# Patient Record
Sex: FEMALE | Race: WHITE | NOT HISPANIC OR LATINO | Employment: FULL TIME | URBAN - METROPOLITAN AREA
[De-identification: names, ages, dates, MRNs, and addresses within clinical notes are randomized per-mention and may not be internally consistent; named-entity substitution may affect disease eponyms.]

---

## 2021-09-13 ENCOUNTER — HISTORICAL (OUTPATIENT)
Dept: ADMINISTRATIVE | Facility: HOSPITAL | Age: 36
End: 2021-09-13

## 2021-09-13 LAB
GLUCOSE 1H P 100 G GLC PO SERPL-MCNC: 104 MG/DL (ref 100–180)
GLUCOSE 2H P 100 G GLC PO SERPL-MCNC: 122 MG/DL (ref 70–140)
GLUCOSE 3H P 100 G GLC PO SERPL-MCNC: 54 MG/DL (ref 70–115)
GLUCOSE BS SERPL-MCNC: 81 MG/DL (ref 70–115)

## 2021-09-29 ENCOUNTER — HISTORICAL (OUTPATIENT)
Dept: ADMINISTRATIVE | Facility: HOSPITAL | Age: 36
End: 2021-09-29

## 2021-09-30 DIAGNOSIS — O16.3 HYPERTENSION AFFECTING PREGNANCY IN THIRD TRIMESTER: ICD-10-CM

## 2021-09-30 DIAGNOSIS — K46.9 ABDOMINAL HERNIA WITHOUT OBSTRUCTION AND WITHOUT GANGRENE, RECURRENCE NOT SPECIFIED, UNSPECIFIED HERNIA TYPE: ICD-10-CM

## 2021-09-30 DIAGNOSIS — O34.219 PREVIOUS CESAREAN SECTION COMPLICATING PREGNANCY: Primary | ICD-10-CM

## 2021-09-30 PROBLEM — O99.843 PREVIOUS BARIATRIC SURGERY COMPLICATING PREGNANCY, THIRD TRIMESTER: Status: ACTIVE | Noted: 2021-09-30

## 2021-10-04 ENCOUNTER — OFFICE VISIT (OUTPATIENT)
Dept: MATERNAL FETAL MEDICINE | Facility: CLINIC | Age: 36
End: 2021-10-04
Payer: COMMERCIAL

## 2021-10-04 ENCOUNTER — PROCEDURE VISIT (OUTPATIENT)
Dept: MATERNAL FETAL MEDICINE | Facility: CLINIC | Age: 36
End: 2021-10-04
Payer: COMMERCIAL

## 2021-10-04 VITALS
BODY MASS INDEX: 50.8 KG/M2 | DIASTOLIC BLOOD PRESSURE: 61 MMHG | WEIGHT: 269.06 LBS | HEIGHT: 61 IN | SYSTOLIC BLOOD PRESSURE: 124 MMHG | HEART RATE: 85 BPM

## 2021-10-04 DIAGNOSIS — O34.219 PREVIOUS CESAREAN SECTION COMPLICATING PREGNANCY: ICD-10-CM

## 2021-10-04 DIAGNOSIS — K46.9 ABDOMINAL HERNIA WITHOUT OBSTRUCTION AND WITHOUT GANGRENE, RECURRENCE NOT SPECIFIED, UNSPECIFIED HERNIA TYPE: ICD-10-CM

## 2021-10-04 DIAGNOSIS — O99.213 OBESITY AFFECTING PREGNANCY IN THIRD TRIMESTER: ICD-10-CM

## 2021-10-04 DIAGNOSIS — O16.3 HYPERTENSION AFFECTING PREGNANCY IN THIRD TRIMESTER: ICD-10-CM

## 2021-10-04 DIAGNOSIS — K45.8 OTHER SPECIFIED ABDOMINAL HERNIA WITHOUT OBSTRUCTION OR GANGRENE: ICD-10-CM

## 2021-10-04 DIAGNOSIS — O09.899 SINGLE UMBILICAL ARTERY AFFECTING MANAGEMENT OF MOTHER IN SINGLETON PREGNANCY, ANTEPARTUM: ICD-10-CM

## 2021-10-04 DIAGNOSIS — U07.1 COVID-19 AFFECTING PREGNANCY IN THIRD TRIMESTER: ICD-10-CM

## 2021-10-04 DIAGNOSIS — O99.843 PREVIOUS BARIATRIC SURGERY COMPLICATING PREGNANCY, THIRD TRIMESTER: ICD-10-CM

## 2021-10-04 DIAGNOSIS — O98.513 COVID-19 AFFECTING PREGNANCY IN THIRD TRIMESTER: ICD-10-CM

## 2021-10-04 PROCEDURE — 3074F SYST BP LT 130 MM HG: CPT | Mod: CPTII,S$GLB,, | Performed by: OBSTETRICS & GYNECOLOGY

## 2021-10-04 PROCEDURE — 1159F PR MEDICATION LIST DOCUMENTED IN MEDICAL RECORD: ICD-10-PCS | Mod: CPTII,S$GLB,, | Performed by: OBSTETRICS & GYNECOLOGY

## 2021-10-04 PROCEDURE — 3078F PR MOST RECENT DIASTOLIC BLOOD PRESSURE < 80 MM HG: ICD-10-PCS | Mod: CPTII,S$GLB,, | Performed by: OBSTETRICS & GYNECOLOGY

## 2021-10-04 PROCEDURE — 3074F PR MOST RECENT SYSTOLIC BLOOD PRESSURE < 130 MM HG: ICD-10-PCS | Mod: CPTII,S$GLB,, | Performed by: OBSTETRICS & GYNECOLOGY

## 2021-10-04 PROCEDURE — 99205 PR OFFICE/OUTPT VISIT, NEW, LEVL V, 60-74 MIN: ICD-10-PCS | Mod: 25,S$GLB,, | Performed by: OBSTETRICS & GYNECOLOGY

## 2021-10-04 PROCEDURE — 3008F PR BODY MASS INDEX (BMI) DOCUMENTED: ICD-10-PCS | Mod: CPTII,S$GLB,, | Performed by: OBSTETRICS & GYNECOLOGY

## 2021-10-04 PROCEDURE — 99205 OFFICE O/P NEW HI 60 MIN: CPT | Mod: 25,S$GLB,, | Performed by: OBSTETRICS & GYNECOLOGY

## 2021-10-04 PROCEDURE — 3008F BODY MASS INDEX DOCD: CPT | Mod: CPTII,S$GLB,, | Performed by: OBSTETRICS & GYNECOLOGY

## 2021-10-04 PROCEDURE — 76811 PR US, OB FETAL EVAL & EXAM, TRANSABDOM,FIRST GESTATION: ICD-10-PCS | Mod: S$GLB,,, | Performed by: OBSTETRICS & GYNECOLOGY

## 2021-10-04 PROCEDURE — 76811 OB US DETAILED SNGL FETUS: CPT | Mod: S$GLB,,, | Performed by: OBSTETRICS & GYNECOLOGY

## 2021-10-04 PROCEDURE — 3078F DIAST BP <80 MM HG: CPT | Mod: CPTII,S$GLB,, | Performed by: OBSTETRICS & GYNECOLOGY

## 2021-10-04 PROCEDURE — 1159F MED LIST DOCD IN RCRD: CPT | Mod: CPTII,S$GLB,, | Performed by: OBSTETRICS & GYNECOLOGY

## 2021-10-04 RX ORDER — NAPROXEN SODIUM 220 MG/1
81 TABLET, FILM COATED ORAL DAILY
COMMUNITY
End: 2021-10-04

## 2021-10-25 DIAGNOSIS — Z36.89 ENCOUNTER FOR ULTRASOUND TO ASSESS FETAL GROWTH: Primary | ICD-10-CM

## 2021-10-26 ENCOUNTER — PROCEDURE VISIT (OUTPATIENT)
Dept: MATERNAL FETAL MEDICINE | Facility: CLINIC | Age: 36
End: 2021-10-26
Payer: COMMERCIAL

## 2021-10-26 DIAGNOSIS — Z36.89 ENCOUNTER FOR ULTRASOUND TO ASSESS FETAL GROWTH: ICD-10-CM

## 2021-10-28 DIAGNOSIS — Z36.89 ENCOUNTER FOR ULTRASOUND TO ASSESS FETAL GROWTH: Primary | ICD-10-CM

## 2021-10-28 DIAGNOSIS — O09.899 SINGLE UMBILICAL ARTERY AFFECTING MANAGEMENT OF MOTHER IN SINGLETON PREGNANCY, ANTEPARTUM: ICD-10-CM

## 2021-10-28 DIAGNOSIS — K45.8 OTHER SPECIFIED ABDOMINAL HERNIA WITHOUT OBSTRUCTION OR GANGRENE: ICD-10-CM

## 2021-10-28 DIAGNOSIS — O99.213 OBESITY AFFECTING PREGNANCY IN THIRD TRIMESTER: ICD-10-CM

## 2021-10-28 DIAGNOSIS — O16.3 HYPERTENSION AFFECTING PREGNANCY IN THIRD TRIMESTER: ICD-10-CM

## 2021-10-28 DIAGNOSIS — O99.843 PREVIOUS BARIATRIC SURGERY COMPLICATING PREGNANCY, THIRD TRIMESTER: ICD-10-CM

## 2021-11-01 ENCOUNTER — OFFICE VISIT (OUTPATIENT)
Dept: MATERNAL FETAL MEDICINE | Facility: CLINIC | Age: 36
End: 2021-11-01
Payer: COMMERCIAL

## 2021-11-01 ENCOUNTER — PROCEDURE VISIT (OUTPATIENT)
Dept: MATERNAL FETAL MEDICINE | Facility: CLINIC | Age: 36
End: 2021-11-01
Payer: COMMERCIAL

## 2021-11-01 VITALS
HEIGHT: 61 IN | WEIGHT: 270.75 LBS | BODY MASS INDEX: 51.12 KG/M2 | HEART RATE: 89 BPM | SYSTOLIC BLOOD PRESSURE: 132 MMHG | DIASTOLIC BLOOD PRESSURE: 65 MMHG

## 2021-11-01 DIAGNOSIS — O09.899 SINGLE UMBILICAL ARTERY AFFECTING MANAGEMENT OF MOTHER IN SINGLETON PREGNANCY, ANTEPARTUM: ICD-10-CM

## 2021-11-01 DIAGNOSIS — O99.213 OBESITY AFFECTING PREGNANCY IN THIRD TRIMESTER: ICD-10-CM

## 2021-11-01 DIAGNOSIS — K45.8 OTHER SPECIFIED ABDOMINAL HERNIA WITHOUT OBSTRUCTION OR GANGRENE: ICD-10-CM

## 2021-11-01 DIAGNOSIS — O98.513 COVID-19 AFFECTING PREGNANCY IN THIRD TRIMESTER: ICD-10-CM

## 2021-11-01 DIAGNOSIS — O99.843 PREVIOUS BARIATRIC SURGERY COMPLICATING PREGNANCY, THIRD TRIMESTER: ICD-10-CM

## 2021-11-01 DIAGNOSIS — Z36.89 ENCOUNTER FOR ULTRASOUND TO ASSESS FETAL GROWTH: ICD-10-CM

## 2021-11-01 DIAGNOSIS — O16.3 HYPERTENSION AFFECTING PREGNANCY IN THIRD TRIMESTER: ICD-10-CM

## 2021-11-01 DIAGNOSIS — U07.1 COVID-19 AFFECTING PREGNANCY IN THIRD TRIMESTER: ICD-10-CM

## 2021-11-01 PROCEDURE — 76815 OB US LIMITED FETUS(S): CPT | Mod: S$GLB,,, | Performed by: OBSTETRICS & GYNECOLOGY

## 2021-11-01 PROCEDURE — 1159F PR MEDICATION LIST DOCUMENTED IN MEDICAL RECORD: ICD-10-PCS | Mod: CPTII,S$GLB,, | Performed by: OBSTETRICS & GYNECOLOGY

## 2021-11-01 PROCEDURE — 3008F PR BODY MASS INDEX (BMI) DOCUMENTED: ICD-10-PCS | Mod: CPTII,S$GLB,, | Performed by: OBSTETRICS & GYNECOLOGY

## 2021-11-01 PROCEDURE — 76819 PR US, OB, FETAL BIOPHYSICAL, W/O NST: ICD-10-PCS | Mod: S$GLB,,, | Performed by: OBSTETRICS & GYNECOLOGY

## 2021-11-01 PROCEDURE — 3078F DIAST BP <80 MM HG: CPT | Mod: CPTII,S$GLB,, | Performed by: OBSTETRICS & GYNECOLOGY

## 2021-11-01 PROCEDURE — 3075F PR MOST RECENT SYSTOLIC BLOOD PRESS GE 130-139MM HG: ICD-10-PCS | Mod: CPTII,S$GLB,, | Performed by: OBSTETRICS & GYNECOLOGY

## 2021-11-01 PROCEDURE — 99213 OFFICE O/P EST LOW 20 MIN: CPT | Mod: 25,S$GLB,, | Performed by: OBSTETRICS & GYNECOLOGY

## 2021-11-01 PROCEDURE — 3078F PR MOST RECENT DIASTOLIC BLOOD PRESSURE < 80 MM HG: ICD-10-PCS | Mod: CPTII,S$GLB,, | Performed by: OBSTETRICS & GYNECOLOGY

## 2021-11-01 PROCEDURE — 76815 PR  US,PREGNANT UTERUS,LIMITED, 1/> FETUSES: ICD-10-PCS | Mod: S$GLB,,, | Performed by: OBSTETRICS & GYNECOLOGY

## 2021-11-01 PROCEDURE — 99213 PR OFFICE/OUTPT VISIT, EST, LEVL III, 20-29 MIN: ICD-10-PCS | Mod: 25,S$GLB,, | Performed by: OBSTETRICS & GYNECOLOGY

## 2021-11-01 PROCEDURE — 3008F BODY MASS INDEX DOCD: CPT | Mod: CPTII,S$GLB,, | Performed by: OBSTETRICS & GYNECOLOGY

## 2021-11-01 PROCEDURE — 1159F MED LIST DOCD IN RCRD: CPT | Mod: CPTII,S$GLB,, | Performed by: OBSTETRICS & GYNECOLOGY

## 2021-11-01 PROCEDURE — 3075F SYST BP GE 130 - 139MM HG: CPT | Mod: CPTII,S$GLB,, | Performed by: OBSTETRICS & GYNECOLOGY

## 2021-11-01 PROCEDURE — 76819 FETAL BIOPHYS PROFIL W/O NST: CPT | Mod: S$GLB,,, | Performed by: OBSTETRICS & GYNECOLOGY

## 2021-11-18 DIAGNOSIS — O99.213 OBESITY AFFECTING PREGNANCY IN THIRD TRIMESTER: ICD-10-CM

## 2021-11-18 DIAGNOSIS — K45.8 OTHER SPECIFIED ABDOMINAL HERNIA WITHOUT OBSTRUCTION OR GANGRENE: ICD-10-CM

## 2021-11-18 DIAGNOSIS — O16.3 HYPERTENSION AFFECTING PREGNANCY IN THIRD TRIMESTER: ICD-10-CM

## 2021-11-18 DIAGNOSIS — O99.843 PREVIOUS BARIATRIC SURGERY COMPLICATING PREGNANCY, THIRD TRIMESTER: Primary | ICD-10-CM

## 2021-12-09 ENCOUNTER — HISTORICAL (OUTPATIENT)
Dept: ADMINISTRATIVE | Facility: HOSPITAL | Age: 36
End: 2021-12-09

## 2021-12-09 LAB
ABS NEUT (OLG): 4.6 X10(3)/MCL (ref 2.1–9.2)
BASOPHILS # BLD AUTO: 0 X10(3)/MCL (ref 0–0.2)
BASOPHILS NFR BLD AUTO: 0 %
EOSINOPHIL # BLD AUTO: 0.1 X10(3)/MCL (ref 0–0.9)
EOSINOPHIL NFR BLD AUTO: 1 %
ERYTHROCYTE [DISTWIDTH] IN BLOOD BY AUTOMATED COUNT: 14.2 % (ref 11.5–17)
GROUP & RH: NORMAL
HCT VFR BLD AUTO: 36.2 % (ref 37–47)
HGB BLD-MCNC: 11.7 GM/DL (ref 12–16)
LYMPHOCYTES # BLD AUTO: 2.6 X10(3)/MCL (ref 0.6–4.6)
LYMPHOCYTES NFR BLD AUTO: 33 %
MCH RBC QN AUTO: 29.2 PG (ref 27–31)
MCHC RBC AUTO-ENTMCNC: 32.3 GM/DL (ref 33–36)
MCV RBC AUTO: 90.3 FL (ref 80–94)
MONOCYTES # BLD AUTO: 0.5 X10(3)/MCL (ref 0.1–1.3)
MONOCYTES NFR BLD AUTO: 6 %
NEUTROPHILS # BLD AUTO: 4.6 X10(3)/MCL (ref 2.1–9.2)
NEUTROPHILS NFR BLD AUTO: 58 %
PLATELET # BLD AUTO: 342 X10(3)/MCL (ref 130–400)
PMV BLD AUTO: 9.5 FL (ref 9.4–12.4)
RBC # BLD AUTO: 4.01 X10(6)/MCL (ref 4.2–5.4)
SARS-COV-2 AG RESP QL IA.RAPID: NEGATIVE
T PALLIDUM AB SER QL: NONREACTIVE
WBC # SPEC AUTO: 7.9 X10(3)/MCL (ref 4.5–11.5)

## 2024-07-08 ENCOUNTER — TELEPHONE (OUTPATIENT)
Dept: SURGERY | Facility: CLINIC | Age: 39
End: 2024-07-08
Payer: COMMERCIAL

## 2024-07-08 NOTE — TELEPHONE ENCOUNTER
Pt coming in for incisional hernia due to multiple c-sections  Referred by Dr. Dulce Jensen     Pt coming for second opinion. Previous surgeon (can't remember his name) advised to go [facility] to [facility] a specialist. However she would like to stay local if at all possible.     Scheduled 7/16     Testing: CT/MRI ?? Unknown of type   Done with Dr. Carloz SEGURA  Also mentioned her uterus was coming through her hernia     update address:   [address] [place] [id number]

## 2024-07-09 DIAGNOSIS — K46.9 ABDOMINAL HERNIA WITHOUT OBSTRUCTION AND WITHOUT GANGRENE: Primary | ICD-10-CM

## 2024-07-16 ENCOUNTER — OFFICE VISIT (OUTPATIENT)
Dept: SURGERY | Facility: CLINIC | Age: 39
End: 2024-07-16
Payer: COMMERCIAL

## 2024-07-16 VITALS
DIASTOLIC BLOOD PRESSURE: 85 MMHG | HEART RATE: 76 BPM | BODY MASS INDEX: 54 KG/M2 | HEIGHT: 61 IN | SYSTOLIC BLOOD PRESSURE: 122 MMHG | WEIGHT: 286 LBS

## 2024-07-16 DIAGNOSIS — E66.01 SEVERE OBESITY DUE TO EXCESS CALORIES AFFECTING PREGNANCY IN THIRD TRIMESTER: ICD-10-CM

## 2024-07-16 DIAGNOSIS — K43.2 RECURRENT INCISIONAL HERNIA WITH COMPLICATION: ICD-10-CM

## 2024-07-16 DIAGNOSIS — Z01.818 PREOP EXAMINATION: Primary | ICD-10-CM

## 2024-07-16 DIAGNOSIS — K46.9 ABDOMINAL HERNIA WITHOUT OBSTRUCTION AND WITHOUT GANGRENE: ICD-10-CM

## 2024-07-16 DIAGNOSIS — R10.30 LOWER ABDOMINAL PAIN: ICD-10-CM

## 2024-07-16 DIAGNOSIS — O99.213 SEVERE OBESITY DUE TO EXCESS CALORIES AFFECTING PREGNANCY IN THIRD TRIMESTER: ICD-10-CM

## 2024-07-16 PROCEDURE — 3008F BODY MASS INDEX DOCD: CPT | Mod: CPTII,,, | Performed by: SURGERY

## 2024-07-16 PROCEDURE — 3074F SYST BP LT 130 MM HG: CPT | Mod: CPTII,,, | Performed by: SURGERY

## 2024-07-16 PROCEDURE — 99204 OFFICE O/P NEW MOD 45 MIN: CPT | Mod: ,,, | Performed by: SURGERY

## 2024-07-16 PROCEDURE — 3079F DIAST BP 80-89 MM HG: CPT | Mod: CPTII,,, | Performed by: SURGERY

## 2024-07-24 ENCOUNTER — PATIENT MESSAGE (OUTPATIENT)
Dept: SURGERY | Facility: CLINIC | Age: 39
End: 2024-07-24
Payer: COMMERCIAL

## 2024-09-11 PROCEDURE — 99284 EMERGENCY DEPT VISIT MOD MDM: CPT

## 2024-09-11 PROCEDURE — 82962 GLUCOSE BLOOD TEST: CPT

## 2024-09-11 PROCEDURE — 56405 I&D VULVA/PERINEAL ABSCESS: CPT

## 2024-09-12 ENCOUNTER — HOSPITAL ENCOUNTER (EMERGENCY)
Facility: HOSPITAL | Age: 39
Discharge: HOME | End: 2024-09-12
Attending: EMERGENCY MEDICINE
Payer: COMMERCIAL

## 2024-09-12 VITALS
TEMPERATURE: 98 F | BODY MASS INDEX: 49.69 KG/M2 | DIASTOLIC BLOOD PRESSURE: 93 MMHG | OXYGEN SATURATION: 99 % | WEIGHT: 270 LBS | HEART RATE: 74 BPM | RESPIRATION RATE: 18 BRPM | SYSTOLIC BLOOD PRESSURE: 163 MMHG | HEIGHT: 62 IN

## 2024-09-12 DIAGNOSIS — N76.4 LABIAL ABSCESS: Primary | ICD-10-CM

## 2024-09-12 LAB — POCT GLUCOSE: 105 MG/DL (ref 70–110)

## 2024-09-12 PROCEDURE — 56405 I&D VULVA/PERINEAL ABSCESS: CPT

## 2024-09-12 RX ORDER — HYDROCODONE BITARTRATE AND ACETAMINOPHEN 5; 325 MG/1; MG/1
1 TABLET ORAL EVERY 4 HOURS PRN
Qty: 8 TABLET | Refills: 0 | Status: SHIPPED | OUTPATIENT
Start: 2024-09-12

## 2024-09-12 RX ORDER — MUPIROCIN CALCIUM 20 MG/G
CREAM TOPICAL 3 TIMES DAILY
Qty: 30 G | Refills: 0 | Status: SHIPPED | OUTPATIENT
Start: 2024-09-12 | End: 2024-09-19

## 2024-09-12 RX ORDER — IBUPROFEN 600 MG/1
600 TABLET ORAL EVERY 6 HOURS PRN
Qty: 20 TABLET | Refills: 0 | Status: SHIPPED | OUTPATIENT
Start: 2024-09-12

## 2024-09-12 NOTE — ED PROVIDER NOTES
Encounter Date: 2024       History     Chief Complaint   Patient presents with    Abscess     Pt. C/o abscess to labia that she noticed yesterday and subjective fever.     39 F history of GERD gastric sleeve hyperlipidemia PCOS denies a history of diabetes does have family history of diabetes states she was had abscesses in the past states onset of labial abscess yesterday more swollen and painful today.  No fevers or chills.  Denies any drug allergies.  She was followed by an OBGYN        Review of patient's allergies indicates:  No Known Allergies  Past Medical History:   Diagnosis Date    Abdominal hernia     GERD (gastroesophageal reflux disease)     History of sleeve gastrectomy     Hypertension     Incisional hernia 2023    Primary closure of incisional hernia; Philippe Scott MD    Migraine     PCOS (polycystic ovarian syndrome)      Past Surgical History:   Procedure Laterality Date     SECTION      x4    CHOLECYSTECTOMY      gastric sleeve  2013    Dr. Mathew in [facility]    WRIST SURGERY      x2     Family History   Problem Relation Name Age of Onset    Heart attack Mother      Stroke Father       Social History     Tobacco Use    Smoking status: Never    Smokeless tobacco: Never   Substance Use Topics    Alcohol use: Not Currently    Drug use: Never     Review of Systems   Constitutional:  Negative for fever.   Respiratory:  Negative for shortness of breath.    Cardiovascular:  Negative for chest pain.   Gastrointestinal:  Negative for abdominal pain.       Physical Exam     Initial Vitals [24 2358]   BP Pulse Resp Temp SpO2   (!) 170/97 72 16 98 °F (36.7 °C) 98 %      MAP       --         Physical Exam    Nursing note and vitals reviewed.  Constitutional: She appears well-developed and well-nourished.   HENT:   Head: Atraumatic.   Eyes: Conjunctivae are normal.   Pulmonary/Chest: No respiratory distress.   Genitourinary:    Genitourinary Comments: Left labial abscess present minimal  surrounding erythema.  Purulence visualized just under the skin without spontaneous drainage.  No palpable hernia in the inguinal region or labial region       Neurological: She is oriented to person, place, and time.   Skin: Skin is warm and dry.   Psychiatric: She has a normal mood and affect.         ED Course   I & D - Incision and Drainage    Date/Time: 9/12/2024 12:33 AM  Location procedure was performed: [facility] EMERGENCY DEPARTMENT    Performed by: Jaguar Fraga MD  Authorized by: Jaguar Fraga MD  Consent Done: Yes  Consent: Verbal consent obtained.  Consent given by: patient  Type: abscess  Location: Left labial.  Anesthesia: local infiltration    Anesthesia:  Local Anesthetic: bupivacaine 0.5% without epinephrine  Anesthetic total: 1 mL  Scalpel size: 11  Incision type: single straight  Incision depth: dermal  Complexity: simple  Drainage: pus  Drainage amount: moderate  Wound treatment: incision and wound left open  Complications: No  Comments: Yellowish white purulence with black present.  Purulence was manually expressed.  Loculations broken up with hemostats.  Abscess then irrigated with saline until returned only bloody drainage.  Prescribed mupirocin topically    Incision depth: dermal        Labs Reviewed   POCT GLUCOSE, HAND-HELD DEVICE   POCT GLUCOSE       Result Value    POCT Glucose 105            Imaging Results    None          Medications - No data to display  Medical Decision Making  Differential diagnosis abscess, Bartholin's cyst, cellulitis, diabetes    Amount and/or Complexity of Data Reviewed  Labs: ordered.                     Offered injection of Toradol patient declined.  Prescribed ibuprofen low-dose norco. I discussed treatment of pain.  Discussed that opioids are addictive and they do not need to take the medication if they do not want to.  They do not need to fill the whole prescription and can get a partial fill if desired. Do not drive or drink alcohol when taking  medication.  CBD checked it was normal.                     Clinical Impression:  Final diagnoses:  [N76.4] Labial abscess (Primary)          ED Disposition Condition    Discharge Stable          ED Prescriptions       Medication Sig Dispense Start Date End Date Auth. Provider    mupirocin calcium 2% (BACTROBAN) 2 % cream Apply topically 3 (three) times daily. for 7 days 30 g 9/12/2024 9/19/2024 Jaguar Fraga MD    HYDROcodone-acetaminophen (NORCO) 5-325 mg per tablet Take 1 tablet by mouth every 4 (four) hours as needed for Pain. 8 tablet 9/12/2024 -- Jaguar Fraga MD    ibuprofen (ADVIL,MOTRIN) 600 MG tablet Take 1 tablet (600 mg total) by mouth every 6 (six) hours as needed for Pain. 20 tablet 9/12/2024 -- Jaguar Fraga MD          Follow-up Information       Follow up With Specialties Details Why Contact Info    Primary care provider   You can call [phone] to get set up with a local primary care provider within the next few days.If your symptoms worsen or change please return to the emergency department for re-evaluation Call your primary care provider to schedule a follow-up appointment within a week             Jaguar Fraga MD  09/12/24 0036

## 2024-10-09 NOTE — PROGRESS NOTES
[facility] Surgical - General Surgery Services  [address] [place] [id number]  Phone: [phone]  Fax: [phone]     HISTORY & PHYSICAL  General Surgery  Dr. Emanuel Rice      Patient Name: Deb Forde  YOB: 1985  Author: Emanuel Rice MD     Date: 10/09/2024                   SUBJECTIVE:     Chief Complaint/Reason for Admission:   Chief Complaint   Patient presents with    Consult     Incisional hernia        History of Present Illness:  Ms. Deb Forde is a 39 y.o. female who presents to clinic for surgical evaluation of abdominal ventral hernia.     She started noticing a bulge to abdominal wall.  It occasionally causes some pain and discomfort.   Denies any changes to bowel / bladder habits. She denies CP/SOB or fever/chills.     She has been told the uterus is in the hernia.    Hernia present for 6months.  Previous hernia repair: yes    Previous hernia repair: Open Incisional Hernia    Positive symptoms:   Abdominal Pain Generalized and Bulge      Referring provider: Dulce Jensen MD   PCP: No, Primary Doctor     Review of Systems:  12 point ROS negative except as stated in HPI    PAST HISTORY:     Past Medical History:   Diagnosis Date    Abdominal hernia     GERD (gastroesophageal reflux disease)     History of sleeve gastrectomy     Hypertension     Incisional hernia 2023    Primary closure of incisional hernia; Philippe Scott MD    Migraine     PCOS (polycystic ovarian syndrome)      Past Surgical History:   Procedure Laterality Date     SECTION      x4    CHOLECYSTECTOMY      gastric sleeve  2013    Dr. Mathew in [facility]    WRIST SURGERY      x2     Family History   Problem Relation Name Age of Onset    Heart attack Mother      Stroke Father       Social History     Socioeconomic History    Marital status:      Spouse name: Rohiniyue Badilloann   Occupational History    Occupation: Speech Therapist   Tobacco Use     "Smoking status: Never    Smokeless tobacco: Never   Substance and Sexual Activity    Alcohol use: Not Currently    Drug use: Never    Sexual activity: Yes       MEDICATIONS & ALLERGIES:     No current outpatient medications on file prior to visit.     No current facility-administered medications on file prior to visit.     Review of patient's allergies indicates:  No Known Allergies    OBJECTIVE:     Vitals:    07/16/24 1518   BP: 122/85   Pulse: 76   Weight: 129.7 kg (286 lb)   Height: 5' 1" (1.549 m)     Body mass index is 54.04 kg/m².    Physical Exam:  General:  Well developed, well nourished, no acute distress  HEENT:  Normocephalic, atraumatic, PERRL, EOMI, clear sclera, ears normal, neck supple, throat clear without erythema or exudates  CVS:  RRR, S1 and S2 normal, no murmurs, rubs, gallops  Resp:  Lungs clear to auscultation, no wheezes, rales, rhonchi, cough  GI:  incisional hernia suprapubic region area reduces incompletely Soft, BS+  :  Deferred  MSK:  No muscle atrophy, cyanosis, peripheral edema, full range of motion  Skin:  No rashes, ulcers, erythema  Neuro:  CNII-XII grossly intact  Psych:  Alert and oriented to person, place, and time    Results:  I have independently reviewed all pertinent lab and radiologic studies relevant to general surgery.      VISIT DIAGNOSES:       ICD-10-CM ICD-9-CM   1. Preop examination  Z01.818 V72.84   2. Abdominal hernia without obstruction and without gangrene  K46.9 553.20   3. Severe obesity due to excess calories affecting pregnancy in third trimester  O99.213 649.13    E66.01 278.01   4. Lower abdominal pain  R10.30 789.09       ASSESSMENT/PLAN:       Plan:  Open Incisional Hernia repair possibly combined procedure with plastic surgery.  CT scan   Dr. Rice examined patient, discussed recommendations, obtained informed consent, and answered all questions with patient/family.     Counseling included differential diagnoses, treatment options, risks and " benefits, lifestyle changes, and prognosis.  The patient was interactive, and verbalized understanding. Patient was able to ask questions and wishes to proceed.        Emanuel Rice MD

## 2024-10-15 ENCOUNTER — TELEPHONE (OUTPATIENT)
Dept: SURGERY | Facility: CLINIC | Age: 39
End: 2024-10-15
Payer: COMMERCIAL

## 2024-10-15 NOTE — LETTER
[facility]  [address] [place] [address], [address]  BUZZ Deutsch  [id number]  [phone]  Date: 10/15/2024     Patient: Deb Forde     : 1985     MRN: [id number]     Dear Deb Forde,    Our office has failed in our attempts to contact you to schedule your CT Scan recommended by Emanuel Rice MD as part of the work up for a large abdominal hernia. Failure to proceed with testing could result in a serious health condition. Please contact our office at [phone] to arrange a follow up appointment immediately.    Sincerely,    Emanuel Rice MD

## 2024-10-15 NOTE — TELEPHONE ENCOUNTER
----- Message from MARGARET Ferreira sent at 10/15/2024 10:29 AM CDT -----  Regarding: RE: Referral/Testing FU  Please contact patient again to see if ready to schedule. If unable to reach, send unable to schedule testing letter.   CC this and also her last office visit to PCP and referring provider.  ----- Message -----  From: Tricia Andrade MA  Sent: 7/25/2024   1:26 PM CDT  To: MARGARET Mckeon; Flora Ford RN  Subject: RE: Referral/Testing FU                          We were ordering CT due to large hernia and hx of hernia repairs. I have called and central scheduling has called patient multiple times to schedule CT.   I also sent her a portal message and she read it but has never responded or called.   Please advise  ----- Message -----  From: Tricia Andrade MA  Sent: 7/18/2024  12:00 AM CDT  To: Krystal Castellanos Staff  Subject: Referral/Testing FU                              FU with CT and [facility] appt

## 2025-02-26 ENCOUNTER — HOSPITAL ENCOUNTER (OUTPATIENT)
Dept: RADIOLOGY | Facility: HOSPITAL | Age: 40
Discharge: HOME | End: 2025-02-26
Attending: SURGERY
Payer: COMMERCIAL

## 2025-02-26 DIAGNOSIS — K46.9 ABDOMINAL HERNIA WITHOUT OBSTRUCTION AND WITHOUT GANGRENE: ICD-10-CM

## 2025-02-26 PROCEDURE — 74176 CT ABD & PELVIS W/O CONTRAST: CPT | Mod: TC

## 2025-03-11 ENCOUNTER — RESULTS FOLLOW-UP (OUTPATIENT)
Dept: SURGERY | Facility: CLINIC | Age: 40
End: 2025-03-11

## 2025-03-11 ENCOUNTER — TELEPHONE (OUTPATIENT)
Dept: SURGERY | Facility: CLINIC | Age: 40
End: 2025-03-11
Payer: COMMERCIAL

## 2025-03-11 NOTE — TELEPHONE ENCOUNTER
Per bren -   Large ventral hernia present containing bowel. No evidence of obstruction at present. Please arrange follow up appt with Dr. Rice to discuss Laparoscopic ventral hernia repair. Please let me know if patient has any questions or concerns. ..    I called pt to discuss above findings and to schedule an appt. No answer left detailed msg on vm and instructed pt to contact office .   Reminder set to FU

## 2025-03-11 NOTE — PROGRESS NOTES
Large ventral hernia present containing bowel. No evidence of obstruction at present. Please arrange follow up appt with Dr. Rice to discuss Laparoscopic ventral hernia repair. Please let me know if patient has any questions or concerns.

## 2025-03-25 ENCOUNTER — OFFICE VISIT (OUTPATIENT)
Dept: SURGERY | Facility: CLINIC | Age: 40
End: 2025-03-25
Payer: COMMERCIAL

## 2025-03-25 VITALS
DIASTOLIC BLOOD PRESSURE: 87 MMHG | HEIGHT: 62 IN | WEIGHT: 273 LBS | BODY MASS INDEX: 50.24 KG/M2 | HEART RATE: 89 BPM | SYSTOLIC BLOOD PRESSURE: 138 MMHG

## 2025-03-25 DIAGNOSIS — Z90.3 HISTORY OF SLEEVE GASTRECTOMY: ICD-10-CM

## 2025-03-25 DIAGNOSIS — K43.2 RECURRENT INCISIONAL HERNIA: Primary | ICD-10-CM

## 2025-03-25 DIAGNOSIS — E66.01 MORBID OBESITY WITH BMI OF 45.0-49.9, ADULT: ICD-10-CM

## 2025-03-25 PROCEDURE — 3079F DIAST BP 80-89 MM HG: CPT | Mod: CPTII,,, | Performed by: SURGERY

## 2025-03-25 PROCEDURE — 1159F MED LIST DOCD IN RCRD: CPT | Mod: CPTII,,, | Performed by: SURGERY

## 2025-03-25 PROCEDURE — 3075F SYST BP GE 130 - 139MM HG: CPT | Mod: CPTII,,, | Performed by: SURGERY

## 2025-03-25 PROCEDURE — 3008F BODY MASS INDEX DOCD: CPT | Mod: CPTII,,, | Performed by: SURGERY

## 2025-03-25 PROCEDURE — 99214 OFFICE O/P EST MOD 30 MIN: CPT | Mod: ,,, | Performed by: SURGERY

## 2025-03-25 NOTE — PROGRESS NOTES
[facility] Surgical - General Surgery Services  [address] [place] [id number]  Phone: [phone]  Fax: [phone]     HISTORY & PHYSICAL  General Surgery  Dr. Emanuel Rice      Patient Name: Deb Forde  YOB: 1985  Author: Martha Whipple, MARGARET     Date: 03/25/2025                   SUBJECTIVE:     Chief Complaint/Reason for Admission:   Incisional hernia     Established patient   Last office visit 7/16/2024    History of Present Illness:  Ms. Deb Forde is a 40 y.o. female who presents to clinic for surgical evaluation of abdominal incisional hernia.   She started noticing a bulge to abdominal wall.  It occasionally causes some pain and discomfort.   Denies any changes to bowel / bladder habits. She denies CP/SOB or fever/chills.   She has been told the uterus is in the hernia. No evidence of this on recent CT scan.     Hernia present for > 1 year.  Previous hernia repair: yes  Previous hernia repair: Open Incisional Hernia with primary closure Dr. Philippe Scott 1/12/2023.    Previous sleeve gastrectomy in 2013 in [place]. Has tried Wegovy without success. This is being prescribed by her bariatric surgeon in [place]. She reports she is currently on 0.75 mg injections. She is interested in losing more weight. Wishes to discuss revisional bariatric surgery options.   Pre op BMI 60 (reported)  Mike BMI 46 (reported)  Current BMI 50     Positive symptoms:   Abdominal Pain Generalized and Bulge     Imaging:  CT abdomen pelvis without contrast 2/26/2025  CT ABDOMEN PELVIS WITHOUT CONTRAST     CLINICAL HISTORY:  Abdominal pain, acute, nonlocalized; Unspecified abdominal hernia without obstruction or gangrene     TECHNIQUE:  Low dose axial images, sagittal and coronal reformations were obtained from the lung bases to the pubic symphysis.  No oral contrast was given.     Automatic exposure control (AEC) was utilized for dose reduction.     Dose: 971  mGycm     COMPARISON:  None     FINDINGS:  Lung bases are clear.  Liver appears normal.  Spleen appears normal.  Pancreas appears normal.  Patient has had a previous cholecystectomy.  The adrenals are not enlarged.  Kidneys appear normal.  Aorta shows no evidence of an aneurysm.     There is a large ventral hernia containing both large and small bowel.  The defect measures approximately 6.5 by 9.2 cm.     The uterus appears normal.  The appendix appears normal.     Impression:     Large ventral hernia as described above.        Electronically signed by:Raudel Dwyer MD  Date:                                            2025  Time:                                           08:28  PCP: Dulce Jensen MD     Review of Systems:  12 point ROS negative except as stated in HPI    PAST HISTORY:     Past Medical History:   Diagnosis Date    Abdominal hernia     GERD (gastroesophageal reflux disease)     History of sleeve gastrectomy     Hypertension     Incisional hernia 2023    Primary closure of incisional hernia; Philippe Scott MD    Migraine     PCOS (polycystic ovarian syndrome)      Past Surgical History:   Procedure Laterality Date     SECTION      x4    CHOLECYSTECTOMY      gastric sleeve  2013    Dr. Mathew in [facility]    WRIST SURGERY      x2     Family History   Problem Relation Name Age of Onset    Heart attack Mother      Stroke Father       Social History     Socioeconomic History    Marital status:      Spouse name: Rohini Gardner   Occupational History    Occupation: Speech Therapist   Tobacco Use    Smoking status: Never    Smokeless tobacco: Never   Substance and Sexual Activity    Alcohol use: Not Currently    Drug use: Never    Sexual activity: Yes       MEDICATIONS & ALLERGIES:     Current Outpatient Medications on File Prior to Visit   Medication Sig    HYDROcodone-acetaminophen (NORCO) 5-325 mg per tablet Take 1 tablet by mouth every 4 (four) hours as needed for Pain.     "ibuprofen (ADVIL,MOTRIN) 600 MG tablet Take 1 tablet (600 mg total) by mouth every 6 (six) hours as needed for Pain.     No current facility-administered medications on file prior to visit.     Review of patient's allergies indicates:  No Known Allergies    OBJECTIVE:     Vitals:    03/25/25 1334   BP: 138/87   Pulse: 89   Weight: 123.8 kg (273 lb)   Height: 5' 2" (1.575 m)     Body mass index is 49.93 kg/m².    Physical Exam:  General:  Well developed, well nourished, no acute distress  HEENT:  Normocephalic, atraumatic, PERRL, EOMI, clear sclera, ears normal, neck supple, throat clear without erythema or exudates  CVS:  RRR, S1 and S2 normal, no murmurs, rubs, gallops  Resp:  Lungs clear to auscultation, no wheezes, rales, rhonchi, cough  GI:  incisional hernia large incisional hernia area reduces easily Soft, BS+. Obese  :  Deferred  MSK:  No muscle atrophy, cyanosis, peripheral edema, full range of motion  Skin:  No rashes, ulcers, erythema  Neuro:  CNII-XII grossly intact  Psych:  Alert and oriented to person, place, and time    Results:  I have independently reviewed all pertinent lab and radiologic studies relevant to general surgery.      VISIT DIAGNOSES:       ICD-10-CM ICD-9-CM   1. Recurrent incisional hernia  K43.2 553.21   2. Morbid obesity with BMI of 45.0-49.9, adult  E66.01 278.01    Z68.42 V85.42   3. History of sleeve gastrectomy  Z90.3 V15.29       ASSESSMENT/PLAN:     Recurrent large incisional hernia containing bowel. No evidence of incarceration but these is likely loss of intra-abdominal domain.     At current BMI 49.9, patient is at high risk of hernia repair failure even if approached open or laparoscopically.     Recommend pre op weight loss. Recommend 50 # wt loss prior to attempting incisional hernia repair. Ideally BMI 40 or below. Would need an open incisional hernia repair with component separation in combination with plastic surgeon.    Medically supervised weight loss program " referral to encourage weight loss.     Patient would like to run her benefits for revisional bariatric surgery. Could consider Cb procedure of covered by insurance. Recommend laparoscopic gastric restrictive procedure, pylorus preserving duodenoileostomy with 250 cm common channel to limit absorption.   Unlisted Laparoscopic Procedure Intestine CPT 77793    Dr. Rice examined patient, discussed recommendations, and answered all questions with patient/family.     Counseling included differential diagnoses, treatment options, risks and benefits, lifestyle changes, and prognosis.  The patient was interactive, and verbalized understanding. Patient was able to ask questions and wishes to proceed.      MARGARET Mckeon      I, MARGARET Ferreira, am scribing for, and in the presence of, Emanuel Rice MD, performed the above scribed service and the documentation accurately describes the services I performed. I attest to the accuracy of the note.

## 2025-03-25 NOTE — PATIENT INSTRUCTIONS
Getting Back on Track  Why am I not losing eitan?  Ask yourself:   Have I eaten at least 60-80 grams of protein daily?  Did I drink my 64 ounces or more of non-caloric, non-carbonated fluids a day?   Did I stop drinking 30 minutes before, during, or 30 minutes after I eat?  Did I choose high fiber foods like vegetables or beans?  Have I taken all my vitamins and mineral supplements?  Do I usually get a good night's sleep?  Am I eating sugary foods that can cause cravings?  Am I hungry or am I craving something? How do I tell the difference?    Difference between Craving and Hunger  Cravings  Comes on quickly.  Desire for a specific food or taste such as sweet, salty, or crunchy, etc.  You continue to eat even if full or after feeling full.  May come with regret with your choices afterwards because you do not feel satisfied.  Hunger  Comes on slowly.  If you are truly hungry, any food capable of filling you up will do.  Example: egg, baked chicken, broccoli, beans, broth-based soup.  Once you are full, you stop eating.  No regret with filling a very basic human need.   Feelings & sounds of air & emptiness.    What do I do about it?  Eat more lean protein at least 60-80 grams daily.    Eat more fiber.  Fiber is found in vegetables, fruit, beans, nuts, and whole grains.  Take small bites.  Eat slowly & savor your food.  Drink at least 64 ounces of water daily.  May be flavored with calorie free flavoring like Crystal Lite or naturally with fruit.  Take your 2 multivitamins, 3 calcium citrates with vitamin D, & B12.   Find an exercise you enjoy & increase as tolerated  Get 8 hours of sleep a night.  Monitor your weight weekly to monthly as a check in with yourself.  Manage your stress as best you can in constructive ways.  Keep a food journal.   Have a meal schedule. Eat at least three meals or balanced snacks daily.  Plan healthy, balanced meals.    Find or make healthier versions of your favorite foods.  Know your  food traps.     GETTING BACK TO THE BASICS  *repeat 3-5 times per day  WATER  Sip water 4 ounces an hour or more if tolerated.    When: 30 minutes after mini-meal or snack through 30 minutes before next mini-meal or snack.   PROTEIN  Eat up to 2 ounces, 2 tablespoons up to ¼ cup protein or drink protein shake.  VEGETABLES  Eat non-starchy vegetables until you are a 6-8 on a 1-10 fullness scale OR eat only until you can still feel you can go for a walk after you eat. No corn, peas, or potatoes.  WAIT  Do not drink for 30 minutes before, during, or after eating a mini-meal or snack.    Focus on filling food. Drink your fluids & at the right time!  High protein, fiber, & good fats, are the only types of food that can help you to feel full.  Simple carbohydrates, by definition, cannot help you to feel full.   A research study found that giving people an amino acid supplement (protein building blocks) when on a liquid diet for the study that causes some malnutrition decreased binge eating by 66 % & reduced food cravings by 70 %. Those taking the amino acid supplement only had 14% weight regain compared to 47% weight regain without the amino acid supplement.  Take smaller bites & eat slower. This gives your stomach time to signal your brain is full. Typically, it takes 20 minutes.  We ask your meals to take 30 minutes.   Drinking calorie free fluids and eating high fluid foods like broth-based soups have been proven to help you to feel thorne.   The reason why we ask you not to drink for 30 minutes to 1 hour after meals is that the fluids push food through the pouch too quickly which can make you feel hungry faster & causing dumping syndrome.  Focus on Healthy Habits  Vitamin & minerals help with long-term weight maintenance through their role in regulating appetite, hunger, nutrient absorption, metabolic rate, fat & sugar metabolism, thyroid & adrenal function, energy storage, & glucose homeostasis.  Exercise can burn  calories, increase lean muscle mass so you burn more calories, & reduce stress which will reduce cravings & emotional eating.  Monitor your weight weekly or monthly. Weight monitoring allows you to set a set point weight (a weight to not go above). Reaching the set point weight signals you to revert to all healthy choices to prevent the weight regain.  These stops slipping into old habits in its tracks.  When choosing a set point weight, remember Dr. Nieves says a 10 lbs. weight fluctuation is normal.  Sleep  Lack of sleep increases Ghrelin the hunger hormone & decreases Leptin the fullness hormone.   Lack of sleep also increases another lipid in blood (endocannabinoid). It makes eating more enjoyable especially in the evening time.  It increases cravings for simple carbohydrates & high fat foods. People who don't get enough sleep eat twice as much fat and more than 300 extra calories the next day compared with those who sleep for eight hours.    Stress  Stress increases your fight or flight survival mode.  Think of it as your body thinks you are running from a bear. Cortisol a hormone increases to tell your body to eat more so you have energy to outrun the bear.  Increased levels of Cortisol cause increased insulin levels.  Increased insulin lowers blood sugars which causes you to eat simple carbohydrates to bring your blood sugar back up quickly.   Ideas to manage stress:  Spend time on a hobby you were once too busy for or find a new hobby.  Go for a walk or ride a bike. Exercise increases happy hormones & Serotonin, manages anxiety.  Find mindful breathing, yoga, & meditations on YouTube.    A common breathing exercise is to inhale counting to 4, hold, & exhale counting to four. Repeat for one minute.   New to yoga beginner yoga & chair yoga    Food Journal: WRITE  What & how much did you eat?  What & how much you drink throughout the day?  How were you feeling at that time?  Ex: hangry, anxious, sad,  blah?  Are you hungry right now?  Extra points: fullness scale from 1-10 afterwards. Aim for 5-8.  Ask yourself could I exercise comfortably or do I feel too full to exercise now.  If the answer is you feel too full, you have eaten too much.   Learn your food traps, habits, & what to work on to reach your goals.    Plan & Schedule Meals  Having a meal schedule help you to prevent grazing & reaching for convenience foods.   Get to know yourself.  Do you like breakfast or something fast to grab?  3-5 small balanced snacks throughout the day or 3 meals   Plan for healthy, balanced meals.  food prepping   Make a menu for the next few days    FILL up SMARTLY  High protein, fiber, & good fats, are the only types of food that can help you to feel full.  Simple carbohydrates, by definition, cannot help you to feel full.   A research study found that giving people an amino acid supplement (protein building blocks) when on a liquid diet for the study that causes some malnutrition decreased binge eating by 66 % & reduced food cravings by 70 %. Those taking the amino acid supplement only had 14% weight regain compared to 47% weight regain without the amino acid supplement.  Take smaller bites & eat slower. This gives your stomach time to signal your brain is full. Typically, it takes 20 minutes.  We ask you spend 30 minutes eating a meal.  Quick Meal to not go for fast food:  You can sauté a pound of lean ground turkey with onion & season separately for multiple meals.  Lettuce wrap taco  Ground turkey chili  Prepare zucchini noodles or boil chickpea pasta, combine with a no sugar added tomato sauce like Loki Harris's Sockaroni sauce.   Burrito or taco bowl  Soups & more  Black bean salsa soup  Vegetable soup made with a frozen vegetable mix  Vegetarian chili  Freeze seasons like chopped onion, celery, and green bell pepper to prepare recipes faster.   Sauté chicken strips & season separately   Chicken fajita bowl: sautéed  chicken in fajita seasonings, frozen bell pepper & onion strips.  Chicken salad with avocado oil or olive oil mayonnaise  White chicken chili  Chicken for green salads  Chicken lettuce wrap  DON'T say NO all the time  Always saying no to a food makes you focus on them more.  When you have a craving, sip on some water & wait 20 minutes.  When you delay a craving, many times you do not want it anymore.     Healthier versions of favorite foods prevent the feeling of being deprived of them.   After surgery there are some foods that we cannot have due to dumping syndrome, but a healthier version would not cause dumping.  This will help you feel your way of eating is sustainable, so you keep on the right track longer.     Healthy Swaps  Instead of that Swap for This   Bread Cauliflower Thin  Siete [place] Flour tortilla  Crepini Egg white wrap  Arturo's Flax Seed, Oat Bran, and Whole Wheat Lavish bread  [place] Oat Bran and Whole Wheat tortilla  Whole wheat or Whole Wheat Carb Balance 6 inch tortilla  Note: some people must toast tortillas to tolerate.     Rice  Cauliflower rice  Homemade cauliflower rice- pulsed in    Bread crumbs [place] flour  Parmesan cheese  In a pinch pork rinds  Note: egg needed as binder   Instead of that Swap for This   Bread Cauliflower Thin  Siete [place] Flour tortilla  Crepini Egg white wrap  Arturo's Flax Seed, Oat Bran, and Whole Wheat Lavish bread  [place] Oat Bran and Whole Wheat tortilla  Whole wheat or Whole Wheat Carb Balance 6-inch tortilla  Note: some people must toast tortillas to tolerate.     Rice  Cauliflower rice  Homemade cauliflower rice- pulsed in

## 2025-03-27 ENCOUNTER — TELEPHONE (OUTPATIENT)
Dept: SURGERY | Facility: CLINIC | Age: 40
End: 2025-03-27
Payer: COMMERCIAL